# Patient Record
Sex: MALE | Race: WHITE | NOT HISPANIC OR LATINO | Employment: FULL TIME | URBAN - METROPOLITAN AREA
[De-identification: names, ages, dates, MRNs, and addresses within clinical notes are randomized per-mention and may not be internally consistent; named-entity substitution may affect disease eponyms.]

---

## 2017-01-20 ENCOUNTER — HISTORICAL (OUTPATIENT)
Dept: ADMINISTRATIVE | Facility: HOSPITAL | Age: 52
End: 2017-01-20

## 2017-01-20 LAB
ALBUMIN SERPL-MCNC: 4.8 G/DL (ref 3.1–4.7)
ALP SERPL-CCNC: 48 IU/L (ref 40–104)
ALT (SGPT): 44 IU/L (ref 3–33)
AST SERPL-CCNC: 46 IU/L (ref 10–40)
BASOPHILS NFR BLD: 0 K/UL (ref 0–0.2)
BASOPHILS NFR BLD: 0.8 %
BILIRUB SERPL-MCNC: 0.5 MG/DL (ref 0.3–1)
BUN SERPL-MCNC: 10 MG/DL (ref 8–20)
CALCIUM SERPL-MCNC: 9.1 MG/DL (ref 7.7–10.4)
CHLORIDE: 105 MMOL/L (ref 98–110)
CO2 SERPL-SCNC: 27 MMOL/L (ref 22.8–31.6)
CREATININE: 1.12 MG/DL (ref 0.6–1.4)
EOSINOPHIL NFR BLD: 0.2 K/UL (ref 0–0.7)
EOSINOPHIL NFR BLD: 4.6 %
ERYTHROCYTE [DISTWIDTH] IN BLOOD BY AUTOMATED COUNT: 14.6 % (ref 12.5–14.5)
GLUCOSE: 109 MG/DL (ref 70–99)
GRAN #: 2 K/UL (ref 1.4–6.5)
GRAN%: 50.6 %
HCT VFR BLD AUTO: 35.8 % (ref 39–55)
HGB BLD-MCNC: 11.8 G/DL (ref 14–16)
IMMATURE GRANS (ABS): 0 K/UL (ref 0–1)
IMMATURE GRANULOCYTES: 0.3 %
LYMPH #: 1.4 K/UL (ref 1.2–3.4)
LYMPH%: 35.6 %
MCH RBC QN AUTO: 31.2 PG (ref 25–35)
MCHC RBC AUTO-ENTMCNC: 33 G/DL (ref 31–36)
MCV RBC AUTO: 94.7 FL (ref 80–100)
MONO #: 0.3 K/UL (ref 0.1–0.6)
MONO%: 8.1 %
NUCLEATED RBCS: 0 %
NUCLEATED RED BLOOD CELLS: 0 /100 WBC
PERFORMED BY:: ABNORMAL
PLATELET # BLD AUTO: 273 K/UL (ref 140–440)
PMV BLD AUTO: 9.7 FL (ref 7.4–10.4)
POTASSIUM SERPL-SCNC: 3.6 MMOL/L (ref 3.5–5)
PROT SERPL-MCNC: 7.7 G/DL (ref 6–8.2)
RBC # BLD AUTO: 3.78 M/UL (ref 4.3–5.9)
SODIUM: 140 MMOL/L (ref 134–144)
WBC # BLD: 3.9 K/UL (ref 5–10)

## 2017-03-01 ENCOUNTER — HISTORICAL (OUTPATIENT)
Dept: ADMINISTRATIVE | Facility: HOSPITAL | Age: 52
End: 2017-03-01

## 2017-03-01 LAB
ALBUMIN SERPL-MCNC: 4.6 G/DL (ref 3.1–4.7)
ALP SERPL-CCNC: 49 IU/L (ref 40–104)
ALT (SGPT): 43 IU/L (ref 3–33)
AST SERPL-CCNC: 35 IU/L (ref 10–40)
BASOPHILS NFR BLD: 0 K/UL (ref 0–0.2)
BASOPHILS NFR BLD: 0.4 %
BILIRUB SERPL-MCNC: 0.6 MG/DL (ref 0.3–1)
BUN SERPL-MCNC: 13 MG/DL (ref 8–20)
CALCIUM SERPL-MCNC: 9.3 MG/DL (ref 7.7–10.4)
CHLORIDE: 100 MMOL/L (ref 98–110)
CO2 SERPL-SCNC: 29.1 MMOL/L (ref 22.8–31.6)
CREATININE: 1.18 MG/DL (ref 0.6–1.4)
EOSINOPHIL NFR BLD: 0.2 K/UL (ref 0–0.7)
EOSINOPHIL NFR BLD: 3.1 %
ERYTHROCYTE [DISTWIDTH] IN BLOOD BY AUTOMATED COUNT: 14.8 % (ref 12.5–14.5)
GLUCOSE: 96 MG/DL (ref 70–99)
GRAN #: 2.8 K/UL (ref 1.4–6.5)
GRAN%: 53.9 %
HCT VFR BLD AUTO: 35.3 % (ref 39–55)
HGB BLD-MCNC: 11.6 G/DL (ref 14–16)
IMMATURE GRANS (ABS): 0 K/UL (ref 0–1)
IMMATURE GRANULOCYTES: 0.2 %
LYMPH #: 1.6 K/UL (ref 1.2–3.4)
LYMPH%: 31.3 %
MCH RBC QN AUTO: 31.3 PG (ref 25–35)
MCHC RBC AUTO-ENTMCNC: 32.9 G/DL (ref 31–36)
MCV RBC AUTO: 95.1 FL (ref 80–100)
MONO #: 0.6 K/UL (ref 0.1–0.6)
MONO%: 11.1 %
NUCLEATED RBCS: 0 %
NUCLEATED RED BLOOD CELLS: 0 /100 WBC
PERFORMED BY:: ABNORMAL
PLATELET # BLD AUTO: 261 K/UL (ref 140–440)
PMV BLD AUTO: 9.6 FL (ref 8.8–12.7)
POTASSIUM SERPL-SCNC: 4 MMOL/L (ref 3.5–5)
PROT SERPL-MCNC: 7.8 G/DL (ref 6–8.2)
RBC # BLD AUTO: 3.71 M/UL (ref 4.3–5.9)
SODIUM: 137 MMOL/L (ref 134–144)
WBC # BLD: 5.2 K/UL (ref 5–10)

## 2017-05-02 ENCOUNTER — HISTORICAL (OUTPATIENT)
Dept: ADMINISTRATIVE | Facility: HOSPITAL | Age: 52
End: 2017-05-02

## 2017-05-02 LAB
ALBUMIN SERPL-MCNC: 4.6 G/DL (ref 3.1–4.7)
ALP SERPL-CCNC: 48 IU/L (ref 40–104)
ALT (SGPT): 42 IU/L (ref 3–33)
AST SERPL-CCNC: 33 IU/L (ref 10–40)
BASOPHILS NFR BLD: 0 K/UL (ref 0–0.2)
BASOPHILS NFR BLD: 0.4 %
BILIRUB SERPL-MCNC: 0.5 MG/DL (ref 0.3–1)
BUN SERPL-MCNC: 17 MG/DL (ref 8–20)
CALCIUM SERPL-MCNC: 9.3 MG/DL (ref 7.7–10.4)
CHLORIDE: 104 MMOL/L (ref 98–110)
CO2 SERPL-SCNC: 26.9 MMOL/L (ref 22.8–31.6)
CREATININE: 1 MG/DL (ref 0.6–1.4)
EOSINOPHIL NFR BLD: 0.1 K/UL (ref 0–0.7)
EOSINOPHIL NFR BLD: 3.1 %
ERYTHROCYTE [DISTWIDTH] IN BLOOD BY AUTOMATED COUNT: 15 % (ref 12.5–14.5)
GLUCOSE: 101 MG/DL (ref 70–99)
GRAN #: 2.5 K/UL (ref 1.4–6.5)
GRAN%: 54.9 %
HCT VFR BLD AUTO: 36.8 % (ref 39–55)
HGB BLD-MCNC: 12 G/DL (ref 14–16)
IMMATURE GRANS (ABS): 0 K/UL (ref 0–1)
IMMATURE GRANULOCYTES: 0.2 %
LYMPH #: 1.4 K/UL (ref 1.2–3.4)
LYMPH%: 30.1 %
MCH RBC QN AUTO: 30.5 PG (ref 25–35)
MCHC RBC AUTO-ENTMCNC: 32.6 G/DL (ref 31–36)
MCV RBC AUTO: 93.6 FL (ref 80–100)
MONO #: 0.5 K/UL (ref 0.1–0.6)
MONO%: 11.3 %
NUCLEATED RBCS: 0 %
NUCLEATED RED BLOOD CELLS: 0 /100 WBC
PERFORMED BY:: ABNORMAL
PLATELET # BLD AUTO: 282 K/UL (ref 140–440)
PMV BLD AUTO: 9.5 FL (ref 8.8–12.7)
POTASSIUM SERPL-SCNC: 4.3 MMOL/L (ref 3.5–5)
PROT SERPL-MCNC: 7.7 G/DL (ref 6–8.2)
RBC # BLD AUTO: 3.93 M/UL (ref 4.3–5.9)
SODIUM: 140 MMOL/L (ref 134–144)
WBC # BLD: 4.5 K/UL (ref 5–10)

## 2017-09-06 LAB
ALBUMIN SERPL-MCNC: 4.5 G/DL (ref 3.1–4.7)
ALP SERPL-CCNC: 45 IU/L (ref 40–104)
ALT (SGPT): 40 IU/L (ref 3–33)
AST SERPL-CCNC: 33 IU/L (ref 10–40)
BASOPHILS NFR BLD: 0 K/UL (ref 0–0.2)
BASOPHILS NFR BLD: 0.8 %
BILIRUB SERPL-MCNC: 0.5 MG/DL (ref 0.3–1)
BUN SERPL-MCNC: 16 MG/DL (ref 8–20)
CALCIUM SERPL-MCNC: 9.5 MG/DL (ref 7.7–10.4)
CHLORIDE: 102 MMOL/L (ref 98–110)
CO2 SERPL-SCNC: 30.6 MMOL/L (ref 22.8–31.6)
CREATININE: 1.02 MG/DL (ref 0.6–1.4)
EOSINOPHIL NFR BLD: 0.1 K/UL (ref 0–0.7)
EOSINOPHIL NFR BLD: 3.6 %
ERYTHROCYTE [DISTWIDTH] IN BLOOD BY AUTOMATED COUNT: 14.7 % (ref 12.5–14.5)
GLUCOSE: 98 MG/DL (ref 70–99)
GRAN #: 1.8 K/UL (ref 1.4–6.5)
GRAN%: 51 %
HCT VFR BLD AUTO: 35.2 % (ref 39–55)
HGB BLD-MCNC: 11.7 G/DL (ref 14–16)
IMMATURE GRANS (ABS): 0 K/UL (ref 0–1)
IMMATURE GRANULOCYTES: 0 %
ISTAT CREATININE: 1.1 MG/DL (ref 0.6–1.4)
LYMPH #: 1.2 K/UL (ref 1.2–3.4)
LYMPH%: 32.4 %
MCH RBC QN AUTO: 31.3 PG (ref 25–35)
MCHC RBC AUTO-ENTMCNC: 33.2 G/DL (ref 31–36)
MCV RBC AUTO: 94.1 FL (ref 80–100)
MONO #: 0.4 K/UL (ref 0.1–0.6)
MONO%: 12.2 %
NUCLEATED RBCS: 0 %
NUCLEATED RED BLOOD CELLS: 0 /100 WBC
PERFORMED BY:: ABNORMAL
PLATELET # BLD AUTO: 262 K/UL (ref 140–440)
PMV BLD AUTO: 9.4 FL (ref 8.8–12.7)
POTASSIUM SERPL-SCNC: 4.2 MMOL/L (ref 3.5–5)
PROT SERPL-MCNC: 7.2 G/DL (ref 6–8.2)
RBC # BLD AUTO: 3.74 M/UL (ref 4.3–5.9)
SODIUM: 141 MMOL/L (ref 134–144)
WBC # BLD: 3.6 K/UL (ref 5–10)

## 2017-09-10 PROBLEM — C49.9 SARCOMA: Status: ACTIVE | Noted: 2017-09-10

## 2017-09-10 NOTE — PROGRESS NOTES
"   [facility] Hematology/Oncology  PROGRESS NOTE      Subjective:       Patient ID:   NAME: Narciso Flanagan : 1965     52 y.o. male    Referring Doc: Lucy Bolaños (onc)  Other Physicians: Lilli Obrien (PCP)    Chief Complaint:  GIST f/u    History of Present Illness:     Patient returns today for a regularly scheduled follow-up visit.  The patient had a follow-up CT scan on . He is here with female family member. He never saw local GI or pulmonologist. He had fall with bone chip on vertebrae in neck about 3 months ago. He is seeing a chiropractor Humble Rodrigues.No CP, SOB, HA's or N/V.             ROS:   GEN: normal without any fever, night sweats or weight loss  HEENT: normal with no HA's, sore throat, stiff neck, changes in vision  CV: normal with no CP, SOB, PND, FRAIRE or orthopnea  PULM: normal with no SOB, cough, hemoptysis, sputum or pleuritic pain  GI: normal with no abdominal pain, nausea, vomiting, constipation, diarrhea, melanotic stools, BRBPR, or hematemesis  : normal with no hematuria, dysuria  BREAST: normal with no mass, discharge, pain  SKIN: normal with no rash, erythema, bruising, or swelling    Allergies:  Review of patient's allergies indicates:  No Known Allergies    Medications:    Current Outpatient Prescriptions:     glucosamine sulfate (GLUCOSAMINE) 500 mg Tab, Take by mouth., Disp: , Rfl:     IMATINIB MESYLATE (GLEEVEC ORAL), Take by mouth., Disp: , Rfl:     omeprazole (PRILOSEC) 40 MG capsule, Take 40 mg by mouth once daily., Disp: , Rfl:     PMHx/PSHx Updates:  See patient's last visit with me on 2016.  See H&P on 10/31/16        Pathology:  See path report from 2014          Objective:     Vitals:  Blood pressure 115/77, pulse 72, temperature 97.5 °F (36.4 °C), resp. rate 18, height 5' 10" (1.778 m), weight 90.2 kg (198 lb 12.8 oz).    Physical Examination:   GEN: no apparent distress, comfortable; AAOx3  HEAD: atraumatic and normocephalic  EYES: no pallor, no icterus, " PERRLA  ENT: OMM, no pharyngeal erythema, external ears WNL; no nasal discharge; no thrush  NECK: no masses, thyroid normal, trachea midline, no LAD/LN's, supple  CV: RRR with no murmur; normal pulse; normal S1 and S2; no pedal edema  CHEST: Normal respiratory effort; CTAB; normal breath sounds; no wheeze or crackles  ABDOM: nontender and nondistended; soft; normal bowel sounds; no rebound/guarding  MUSC/Skeletal: ROM normal; no crepitus; joints normal; no deformities or arthropathy  EXTREM: no clubbing, cyanosis, inflammation or swelling  SKIN: no rashes, lesions, ulcers, petechiae or subcutaneous nodules  : no pascual  NEURO: grossly intact; motor/sensory WNL; AAOx3; no tremors  PSYCH: normal mood, affect and behavior  LYMPH: normal cervical, supraclavicular, axillary and groin LN's            Labs:    9/6/17  Lab Results   Component Value Date    WBC 3.6 (L) 09/06/2017    HGB 11.7 (L) 09/06/2017    HCT 35.2 (L) 09/06/2017    MCV 77.6 (L) 01/11/2011     09/06/2017     BMP  Lab Results   Component Value Date     09/06/2017    K 4.2 09/06/2017     09/06/2017    CO2 30.6 09/06/2017    BUN 16 09/06/2017    CREATININE 1.02 09/06/2017    CALCIUM 9.5 09/06/2017    ANIONGAP 15 01/11/2011    ESTGFRAFRICA >60 01/11/2011    EGFRNONAA >60 01/11/2011             Radiology/Diagnostic Studies:    Ct Chest With Contrast    Result Date: 9/6/2017  CMS MANDATED QUALITY DATA - CT RADIATION - 436 All CT scans at this facility utilize dose modulation, iterative reconstruction, and/or weight based dosing when appropriate to reduce radiation dose to as low as reasonably achievable. 100 mL of Omnipaque 350 was administered. MDI CHEST  W CONTRAST CT Indication: C49.9. 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 Respiratory motion artifact mildly diminishes exam sensitivity. Within these limitations, no new pulmonary nodule or mass is evident. There is a stable left lower lobe pneumatocele. There is subsegmental atelectasis involving the dependent components of both lungs as well as scattered mild groundglass opacity suggestive of air trapping. No pleural effusion. Central airways are patent. There is a small hiatal hernia. The esophagus is otherwise unremarkable. There are normal size mediastinal lymph nodes. Stable soft tissue densities in the anterior superior mediastinum may reflect residual thymic tissue and are unchanged from prior. Aorta is normal in caliber. There is bilateral gynecomastia. Bone window images demonstrate no acute or aggressive osseous lesion. Limited images of the upper abdomen demonstrate scattered subcentimeter hypodense hepatic lesions as well as small hyperenhancing arterial foci. Please see separately MRI abdomen performed same day for further description of hepatic abnormalities.     Impression: 1. Stable CT of the chest demonstrating no convincing evidence of intrathoracic metastasis. 2. Stable right lower lobe pulmonary sequestration and other incidental findings as above. Read and electronically signed by: Pa Travis MD on 9/6/2017 9:34 AM CDT PA TRAVIS MD    Mra Abdomen    Result Date: 9/6/2017  CLINICAL HISTORY: 52 years (1965) Male C49.9 patient has a history of a GIST tumor diagnosed in 2014, here for follow-up of liver tumors. TECHNIQUE: MDI ABDOMEN W/WO CONTRAST MRI. 652 images obtained. MRI of the abdomen was performed using a 1.5 Naheed magnet. CONTRAST: 20 mL of Omniscan was administered uneventfully by IV. COMPARISON: CT of the abdomen from 03/01/2017, 12/12/2016 and MRI from 01/28/2015 FINDINGS: Inferior thorax: Again noted is the rounded 6.1 x 3.7 cm mass in the posterior right  lower lobe which is some ill-defined contrast enhancement partially characterized on this exam. This appears to show a arterial supply arising directly from the aorta on the delayed phase contrast enhanced exam, unchanged from the previous exam and consistent with pulmonary sequestration. Liver: Normal in size and configuration. Again noted are scattered rounded T2 hyperintense foci throughout the liver, most consistent with cysts the largest of which are outlined below: - 4 mm lesion in the right lobe of the liver (image 42 series 3) and shows no abnormal enhancement. - 5 mm lesion in the central right lobe of liver is unchanged (image 43) - 2 mm diameter T2 hyperintense foci in the subcapsular right lobe of liver is similarly unchanged. - 4 mm diameter lesion (image 56 series 3) is unchanged These findings are unchanged from the previous exam without new or concerning lesion in the liver identified. There is mild relative diffuse loss of signal intensity within the liver consistent with hepatic steatosis. There is a replaced hepatic arterial supply arising from the aorta. Bile Ducts: No intrahepatic or extrahepatic bile duct dilation is noted. Gallbladder: Normal. Pancreas and Pancreatic Duct: Normal. No pancreatic duct dilation. Spleen: Normal. Adrenal Glands: Normal. Kidneys: Hydronephrosis or hydroureter. There are small cyst seen in the kidneys bilaterally. Peritoneum/Mesentery/Omentum: There is no ascites. Lymph Nodes: No abdominal lymphadenopathy is seen. Visualized Bowel: The colonic diverticula without evidence of acute diverticulitis. Vessels: Normal in caliber. Grossly patent. Bones: No suspicious osseous lesions are seen. Soft Tissues: Unremarkable. Other Findings: None.     IMPRESSION: Enhanced MR - of the abdomen 1. No significant adverse interval change compared to the previous exam, with no new or concerning lesion identified noting sub-5 mm rounded low-attenuation T2 hyperintense foci in the liver  without diffusion restriction or contrast enhancement, favored to represent posttreatment response in a patient with a history of metastatic liver disease. 2. Loss of signal intensity in the liver on opposed phase imaging consistent with hepatic steatosis. 3. Unchanged mass in the posterior right lower lobe partially imaged on this exam with systemic arterial supply in keeping with a pulmonary sequestration. Read and electronically signed by: Brent Burris MD on 9/6/2017 9:52 AM CDT BRENT BURRIS MD      I have reviewed all available lab results and radiology reports.    Assessment/Plan:   (1) 52 y.o. 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  - originally diagnosed in Sept 2014  - he was previously under the care of Dr Rosalind Bolaños at [facility] in [place]  - he has been on Gleevec since Oct 2014  - last CT scans and MR of abdom on 9/6 appear to be stable      (2) Anemia - chronic with microcytic indices; most likely multifactorial etiology; will check iron panel and consider oral iron    (3) Mild leucopenia - also chronic     (4) RLL calcified lung nodule - chronic - I referred him to see Dr Downs with Pulm in the past but he did not follow-up  - stable on latest CT    (5) GERD - followed by Dr Leo Sargent with [facility] GI in past and he was supposed to see local GI specialist but did not    (6) mild vertebral bone chip after fall 3 months ago - seen and followed by Humble Rodrigues ([facility])    (7) Noncompliance with requested referrals - Noncomplinance issue  - patient is persistently noncompliant with my recommendations, medical advice and/or requests which hinders my ability to provide the patient with adequate care. Their continued noncompliant behavior places their own health at risk and could potentially jeopardize their life.       PLAN:  1. Continue with the Gleevec  2. Repeat CT's in 6 months  3. Encouraged compliance  4. Check labs every 3 months and check  up to date iron panel  RTC in 6 months  Fax note to Miky Ureña    Discussion:     I have explained all of the above in detail and the patient understands all of the current recommendation(s). I have answered all of their questions to the best of my ability and to their complete satisfaction.   The patient is to continue with the current management plan.            Electronically signed by Fran Daley MD

## 2017-09-11 ENCOUNTER — OFFICE VISIT (OUTPATIENT)
Dept: HEMATOLOGY/ONCOLOGY | Facility: CLINIC | Age: 52
End: 2017-09-11
Payer: COMMERCIAL

## 2017-09-11 VITALS
WEIGHT: 198.81 LBS | SYSTOLIC BLOOD PRESSURE: 115 MMHG | DIASTOLIC BLOOD PRESSURE: 77 MMHG | HEART RATE: 72 BPM | TEMPERATURE: 98 F | BODY MASS INDEX: 28.46 KG/M2 | RESPIRATION RATE: 18 BRPM | HEIGHT: 70 IN

## 2017-09-11 DIAGNOSIS — C49.A3 MALIGNANT GASTROINTESTINAL STROMAL TUMOR (GIST) OF SMALL INTESTINE: ICD-10-CM

## 2017-09-11 DIAGNOSIS — D64.9 ANEMIA, UNSPECIFIED: ICD-10-CM

## 2017-09-11 DIAGNOSIS — R91.1 LUNG NODULE: ICD-10-CM

## 2017-09-11 DIAGNOSIS — D63.8 ANEMIA OF OTHER CHRONIC DISEASE: ICD-10-CM

## 2017-09-11 DIAGNOSIS — C49.9 SARCOMA: ICD-10-CM

## 2017-09-11 PROBLEM — C49.A0 GIST, MALIGNANT: Status: ACTIVE | Noted: 2017-09-11

## 2017-09-11 LAB
% SATURATION: 17 %
ALBUMIN SERPL-MCNC: 4.4 G/DL (ref 3.1–4.7)
ALP SERPL-CCNC: 45 IU/L (ref 40–104)
ALT (SGPT): 41 IU/L (ref 3–33)
AST SERPL-CCNC: 40 IU/L (ref 10–40)
BASOPHILS NFR BLD: 0 K/UL (ref 0–0.2)
BASOPHILS NFR BLD: 0.5 %
BILIRUB SERPL-MCNC: 0.5 MG/DL (ref 0.3–1)
BUN SERPL-MCNC: 12 MG/DL (ref 8–20)
CALCIUM SERPL-MCNC: 9.1 MG/DL (ref 7.7–10.4)
CHLORIDE: 101 MMOL/L (ref 98–110)
CO2 SERPL-SCNC: 28.5 MMOL/L (ref 22.8–31.6)
CREATININE: 1 MG/DL (ref 0.6–1.4)
EOSINOPHIL NFR BLD: 0.2 K/UL (ref 0–0.7)
EOSINOPHIL NFR BLD: 4.2 %
ERYTHROCYTE [DISTWIDTH] IN BLOOD BY AUTOMATED COUNT: 14.9 % (ref 12.5–14.5)
FERRITIN SERPL-MCNC: 100 NG/ML (ref 37–201)
GLUCOSE: 101 MG/DL (ref 70–99)
GRAN #: 2.2 K/UL (ref 1.4–6.5)
GRAN%: 52.7 %
HCT VFR BLD AUTO: 34.9 % (ref 39–55)
HGB BLD-MCNC: 11.6 G/DL (ref 14–16)
IMMATURE GRANS (ABS): 0 K/UL (ref 0–1)
IMMATURE GRANULOCYTES: 0.2 %
IRON: 66 MCG/DL (ref 32–176)
LYMPH #: 1.3 K/UL (ref 1.2–3.4)
LYMPH%: 30 %
MCH RBC QN AUTO: 31.4 PG (ref 25–35)
MCHC RBC AUTO-ENTMCNC: 33.2 G/DL (ref 31–36)
MCV RBC AUTO: 94.6 FL (ref 80–100)
MONO #: 0.5 K/UL (ref 0.1–0.6)
MONO%: 12.4 %
NUCLEATED RBCS: 0 %
NUCLEATED RED BLOOD CELLS: 0 /100 WBC
PERFORMED BY:: ABNORMAL
PLATELET # BLD AUTO: 256 K/UL (ref 140–440)
PMV BLD AUTO: 9.3 FL (ref 8.8–12.7)
POTASSIUM SERPL-SCNC: 4.1 MMOL/L (ref 3.5–5)
PROT SERPL-MCNC: 7.2 G/DL (ref 6–8.2)
RBC # BLD AUTO: 3.69 M/UL (ref 4.3–5.9)
SODIUM: 138 MMOL/L (ref 134–144)
TOTAL IRON BINDING CAPACITY: 396 MCG/DL (ref 177–435)
WBC # BLD: 4.3 K/UL (ref 5–10)

## 2017-09-11 PROCEDURE — 99214 OFFICE O/P EST MOD 30 MIN: CPT | Mod: ,,, | Performed by: INTERNAL MEDICINE

## 2017-09-11 PROCEDURE — 3008F BODY MASS INDEX DOCD: CPT | Mod: ,,, | Performed by: INTERNAL MEDICINE

## 2017-09-11 RX ORDER — OMEPRAZOLE 40 MG/1
40 CAPSULE, DELAYED RELEASE ORAL DAILY
COMMUNITY

## 2017-09-11 RX ORDER — METHYLPREDNISOLONE 4 MG
TABLET, DOSE PACK ORAL
COMMUNITY

## 2017-09-15 ENCOUNTER — TELEPHONE (OUTPATIENT)
Dept: HEMATOLOGY/ONCOLOGY | Facility: CLINIC | Age: 52
End: 2017-09-15

## 2017-09-15 NOTE — TELEPHONE ENCOUNTER
Spoke to patient letting him know that  did see his recent labs and that the values are ok with him. I told patient not to worry about iron values for now. I told him unless he has any problems , he is to recheck labs in 3 months then again 1 week before his next followup which is in March. 2018. patimell voiced understanding

## 2017-12-04 LAB
% SATURATION: 20 %
ALBUMIN SERPL-MCNC: 4.4 G/DL (ref 3.1–4.7)
ALP SERPL-CCNC: 46 IU/L (ref 40–104)
ALT (SGPT): 57 IU/L (ref 3–33)
AST SERPL-CCNC: 57 IU/L (ref 10–40)
BASOPHILS NFR BLD: 0 K/UL (ref 0–0.2)
BASOPHILS NFR BLD: 0.5 %
BILIRUB SERPL-MCNC: 0.9 MG/DL (ref 0.3–1)
BUN SERPL-MCNC: 12 MG/DL (ref 8–20)
CALCIUM SERPL-MCNC: 9.1 MG/DL (ref 7.7–10.4)
CHLORIDE: 102 MMOL/L (ref 98–110)
CO2 SERPL-SCNC: 26.1 MMOL/L (ref 22.8–31.6)
CREATININE: 1 MG/DL (ref 0.6–1.4)
EOSINOPHIL NFR BLD: 0.1 K/UL (ref 0–0.7)
EOSINOPHIL NFR BLD: 2.2 %
ERYTHROCYTE [DISTWIDTH] IN BLOOD BY AUTOMATED COUNT: 14.6 % (ref 12.5–14.5)
FERRITIN SERPL-MCNC: 135 NG/ML (ref 37–201)
GLUCOSE: 155 MG/DL (ref 70–99)
GRAN #: 4.7 K/UL (ref 1.4–6.5)
GRAN%: 73.7 %
HCT VFR BLD AUTO: 34.7 % (ref 39–55)
HGB BLD-MCNC: 11.7 G/DL (ref 14–16)
IMMATURE GRANS (ABS): 0 K/UL (ref 0–1)
IMMATURE GRANULOCYTES: 0.5 %
IRON: 80 MCG/DL (ref 32–176)
LYMPH #: 1.1 K/UL (ref 1.2–3.4)
LYMPH%: 16.5 %
MCH RBC QN AUTO: 31.3 PG (ref 25–35)
MCHC RBC AUTO-ENTMCNC: 33.7 G/DL (ref 31–36)
MCV RBC AUTO: 92.8 FL (ref 80–100)
MONO #: 0.4 K/UL (ref 0.1–0.6)
MONO%: 6.6 %
NUCLEATED RBCS: 0 %
NUCLEATED RED BLOOD CELLS: 0 /100 WBC
PERFORMED BY:: ABNORMAL
PLATELET # BLD AUTO: 260 K/UL (ref 140–440)
PMV BLD AUTO: 9.4 FL (ref 8.8–12.7)
POTASSIUM SERPL-SCNC: 3.6 MMOL/L (ref 3.5–5)
PROT SERPL-MCNC: 7.5 G/DL (ref 6–8.2)
RBC # BLD AUTO: 3.74 M/UL (ref 4.3–5.9)
SODIUM: 137 MMOL/L (ref 134–144)
TOTAL IRON BINDING CAPACITY: 408 MCG/DL (ref 177–435)
WBC # BLD: 6.4 K/UL (ref 5–10)

## 2017-12-06 ENCOUNTER — TELEPHONE (OUTPATIENT)
Dept: HEMATOLOGY/ONCOLOGY | Facility: CLINIC | Age: 52
End: 2017-12-06

## 2017-12-06 NOTE — TELEPHONE ENCOUNTER
----- Message from Laura Motley sent at 12/6/2017 11:26 AM CST -----  Patient called in stating that he is out on the boat and is vomiting and having diarrhea. He would like to know what he can take over the counter or if he needs to get off the boat and go to the Dr.? Please advise and call patient at [phone]. Thanks

## 2017-12-06 NOTE — TELEPHONE ENCOUNTER
Spoke with pt. States he was having chills, nausea and diarrhea. Currently on Gleevec. Instructed sometimes associated with nausea but should not have fever. Instructed that he needed to go get swabbed for Flu to assure it wasn't that and if so they could give appropriate meds for this. VU.

## 2018-02-19 DIAGNOSIS — C49.A3 MALIGNANT GASTROINTESTINAL STROMAL TUMOR (GIST) OF SMALL INTESTINE: Primary | ICD-10-CM

## 2018-03-14 ENCOUNTER — HISTORICAL (OUTPATIENT)
Dept: ADMINISTRATIVE | Facility: HOSPITAL | Age: 53
End: 2018-03-14

## 2018-03-14 LAB
BASOPHILS NFR BLD: 0 K/UL (ref 0–0.2)
BASOPHILS NFR BLD: 1 %
EOSINOPHIL NFR BLD: 0.1 K/UL (ref 0–0.7)
EOSINOPHIL NFR BLD: 3.4 %
ERYTHROCYTE [DISTWIDTH] IN BLOOD BY AUTOMATED COUNT: 14.8 % (ref 12.5–14.5)
GRAN #: 1.9 K/UL (ref 1.4–6.5)
GRAN%: 49.9 %
HCT VFR BLD AUTO: 35.6 % (ref 39–55)
HGB BLD-MCNC: 11.7 G/DL (ref 14–16)
IMMATURE GRANS (ABS): 0 K/UL (ref 0–1)
IMMATURE GRANULOCYTES: 0.3 %
LYMPH #: 1.3 K/UL (ref 1.2–3.4)
LYMPH%: 34.5 %
MCH RBC QN AUTO: 31.4 PG (ref 25–35)
MCHC RBC AUTO-ENTMCNC: 32.9 G/DL (ref 31–36)
MCV RBC AUTO: 95.4 FL (ref 80–100)
MONO #: 0.4 K/UL (ref 0.1–0.6)
MONO%: 10.9 %
NUCLEATED RBCS: 0 %
NUCLEATED RED BLOOD CELLS: 0 /100 WBC
PERFORMED BY:: ABNORMAL
PLATELET # BLD AUTO: 253 K/UL (ref 140–440)
PMV BLD AUTO: 9.6 FL (ref 8.8–12.7)
RBC # BLD AUTO: 3.73 M/UL (ref 4.3–5.9)
WBC # BLD: 3.9 K/UL (ref 5–10)

## 2018-03-16 ENCOUNTER — TELEPHONE (OUTPATIENT)
Dept: HEMATOLOGY/ONCOLOGY | Facility: CLINIC | Age: 53
End: 2018-03-16

## 2018-03-16 NOTE — TELEPHONE ENCOUNTER
----- Message from Fran Daley MD sent at 3/15/2018  7:59 PM CDT -----  Call him with stable report

## 2018-03-16 NOTE — TELEPHONE ENCOUNTER
Patient called with stable report and voiced understanding.      ----- Message from Fran Daley MD sent at 3/15/2018  7:59 PM CDT -----  Call him with stable report

## 2018-03-19 ENCOUNTER — OFFICE VISIT (OUTPATIENT)
Dept: HEMATOLOGY/ONCOLOGY | Facility: CLINIC | Age: 53
End: 2018-03-19
Payer: COMMERCIAL

## 2018-03-19 VITALS
DIASTOLIC BLOOD PRESSURE: 67 MMHG | TEMPERATURE: 98 F | HEIGHT: 71 IN | SYSTOLIC BLOOD PRESSURE: 107 MMHG | WEIGHT: 187.81 LBS | RESPIRATION RATE: 18 BRPM | BODY MASS INDEX: 26.29 KG/M2 | HEART RATE: 75 BPM

## 2018-03-19 DIAGNOSIS — D63.8 ANEMIA, CHRONIC DISEASE: ICD-10-CM

## 2018-03-19 DIAGNOSIS — D64.9 ANEMIA, UNSPECIFIED TYPE: ICD-10-CM

## 2018-03-19 DIAGNOSIS — C49.A3 MALIGNANT GASTROINTESTINAL STROMAL TUMOR (GIST) OF SMALL INTESTINE: Primary | ICD-10-CM

## 2018-03-19 PROCEDURE — 99214 OFFICE O/P EST MOD 30 MIN: CPT | Mod: ,,, | Performed by: INTERNAL MEDICINE

## 2018-03-19 NOTE — PROGRESS NOTES
[facility] Hematology/Oncology  PROGRESS NOTE      Subjective:       Patient ID:   NAME: Narciso Flanagan : 1965     53 y.o. male    Referring Doc: Lucy Bolaños (onc)  Other Physicians: Lilli Obiren (PCP)    Chief Complaint:  GIST f/u    History of Present Illness:     Patient returns today for a regularly scheduled follow-up visit.  The patient had a follow-up CT scan on . He is here with wife. He had some recent scans. He reports that he is doing ok. No CP, SOB, HA's or N/V.             ROS:   GEN: normal without any fever, night sweats or weight loss  HEENT: normal with no HA's, sore throat, stiff neck, changes in vision  CV: normal with no CP, SOB, PND, FRAIRE or orthopnea  PULM: normal with no SOB, cough, hemoptysis, sputum or pleuritic pain  GI: normal with no abdominal pain, nausea, vomiting, constipation, diarrhea, melanotic stools, BRBPR, or hematemesis  : normal with no hematuria, dysuria  BREAST: normal with no mass, discharge, pain  SKIN: normal with no rash, erythema, bruising, or swelling    Allergies:  Review of patient's allergies indicates:  No Known Allergies    Medications:    Current Outpatient Prescriptions:     glucosamine sulfate (GLUCOSAMINE) 500 mg Tab, Take by mouth., Disp: , Rfl:     IMATINIB MESYLATE (GLEEVEC ORAL), Take by mouth., Disp: , Rfl:     omeprazole (PRILOSEC) 40 MG capsule, Take 40 mg by mouth once daily., Disp: , Rfl:     PMHx/PSHx Updates:  See patient's last visit with me on 2016.  See H&P on 10/31/16        Pathology:  See path report from 2014          Objective:     Vitals:  There were no vitals taken for this visit.    Physical Examination:   GEN: no apparent distress, comfortable; AAOx3  HEAD: atraumatic and normocephalic  EYES: no pallor, no icterus, PERRLA  ENT: OMM, no pharyngeal erythema, external ears WNL; no nasal discharge; no thrush  NECK: no masses, thyroid normal, trachea midline, no LAD/LN's, supple  CV: RRR with no murmur; normal pulse;  normal S1 and S2; no pedal edema  CHEST: Normal respiratory effort; CTAB; normal breath sounds; no wheeze or crackles  ABDOM: nontender and nondistended; soft; normal bowel sounds; no rebound/guarding  MUSC/Skeletal: ROM normal; no crepitus; joints normal; no deformities or arthropathy  EXTREM: no clubbing, cyanosis, inflammation or swelling  SKIN: no rashes, lesions, ulcers, petechiae or subcutaneous nodules  : no pascual  NEURO: grossly intact; motor/sensory WNL; AAOx3; no tremors  PSYCH: normal mood, affect and behavior  LYMPH: normal cervical, supraclavicular, axillary and groin LN's            Labs:       3/14/2018    Lab Results   Component Value Date    WBC 3.9 (L) 03/14/2018    HGB 11.7 (L) 03/14/2018    HCT 35.6 (L) 03/14/2018    MCV 77.6 (L) 01/11/2011     03/14/2018     BMP  Lab Results   Component Value Date     03/14/2018    K 4.0 03/14/2018     03/14/2018    CO2 27.8 03/14/2018    BUN 16 03/14/2018    CREATININE 1.06 03/14/2018    CALCIUM 9.1 03/14/2018    ANIONGAP 15 01/11/2011    ESTGFRAFRICA >60 01/11/2011    EGFRNONAA >60 01/11/2011             Radiology/Diagnostic Studies:    CT scans on 3/14/2018:  IMPRESSION:    1. Stable appearance of numerous subcentimeter hypodense hepatic lesions  compared to prior exams. No new or enlarging hepatic lesion.  2. No CT evidence of intrathoracic metastasis.  3. 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  4. Stable incidental findings as above.      MRA Abdom 3/15/2018:    IMPRESSION:  1. Tiny circumscribed T1 hypointense and T2 hyperintense hepatic lesions are  unchanged when compared to September 2017, with no new hepatic abnormalities  identified.  2. Lobulated soft tissue mass in the posterior segment of the right lower lobe  is unchanged. By report, this reflects pulmonary sequestration.  3. Small bilateral renal cysts.  4. Small amount of gallbladder sludge.  I have reviewed all available lab results and radiology  reports.    Assessment/Plan:   (1) 53 y.o. 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  - originally diagnosed in Sept 2014  - he was previously under the care of Dr Rosalind Bolaños at [facility] in [place]  - he has been on Gleevec since Oct 2014  - last CT scans and MR of abdom on 3/14/2018 and 3/15/2018 are stable    (2) Anemia - chronic with microcytic indices; most likely multifactorial etiology; will check iron panel and consider oral iron  - hgb at 11.7 and stable  - iron was good at 80 and ferritin was good at 135    (3) Mild leucopenia - also chronic; latest wbc at 3.9    (4) RLL calcified lung nodule - chronic - I referred him to see Dr Downs with Pulm in the past but he did not follow-up  - stable on latest CT    (5) GERD - followed by Dr Leo Sargent with [facility] in past and he was supposed to see local GI specialist but did not    (6) mild vertebral bone chip after fall 3 months ago - seen and followed by Humble Rodrigues ([facility])    (7) Noncompliance with requested referrals - Noncomplinance issue  - patient is persistently noncompliant with my recommendations, medical advice and/or requests which hinders my ability to provide the patient with adequate care. Their continued noncompliant behavior places their own health at risk and could potentially jeopardize their life.       PLAN:  1. Continue with the Gleevec  2. Repeat CT's in 6 months  3. Encouraged compliance  4. Check labs every 3 months   RTC in 6 months  Fax note to Miky Ureña    Discussion:     I have explained all of the above in detail and the patient understands all of the current recommendation(s). I have answered all of their questions to the best of my ability and to their complete satisfaction.   The patient is to continue with the current management plan.            Electronically signed by Fran Daley MD

## 2018-05-21 DIAGNOSIS — C49.A3 MALIGNANT GASTROINTESTINAL STROMAL TUMOR (GIST) OF SMALL INTESTINE: Primary | ICD-10-CM

## 2018-05-21 NOTE — TELEPHONE ENCOUNTER
----- Message from Laura Motley sent at 5/21/2018  2:37 PM CDT -----  Patient called in requesting RX refill on Gleevac please be called in or sent to [facility] in [place] on [id number]. Please contact patient once completed at [phone]. Thanks

## 2018-05-22 RX ORDER — IMATINIB MESYLATE 400 MG/1
400 TABLET, FILM COATED ORAL DAILY
Qty: 30 TABLET | Refills: 6 | Status: SHIPPED | OUTPATIENT
Start: 2018-05-22 | End: 2019-01-22 | Stop reason: SDUPTHER

## 2018-06-04 LAB
% SATURATION: 18 %
ALBUMIN SERPL-MCNC: 4.9 G/DL (ref 3.1–4.7)
ALP SERPL-CCNC: 43 IU/L (ref 40–104)
ALT (SGPT): 30 IU/L (ref 3–33)
AST SERPL-CCNC: 31 IU/L (ref 10–40)
BASOPHILS NFR BLD: 0 K/UL (ref 0–0.2)
BASOPHILS NFR BLD: 0.6 %
BILIRUB SERPL-MCNC: 0.6 MG/DL (ref 0.3–1)
BUN SERPL-MCNC: 13 MG/DL (ref 8–20)
CALCIUM SERPL-MCNC: 9.6 MG/DL (ref 7.7–10.4)
CHLORIDE: 103 MMOL/L (ref 98–110)
CO2 SERPL-SCNC: 28.5 MMOL/L (ref 22.8–31.6)
CREATININE: 1.03 MG/DL (ref 0.6–1.4)
EOSINOPHIL NFR BLD: 0.1 K/UL (ref 0–0.7)
EOSINOPHIL NFR BLD: 2 %
ERYTHROCYTE [DISTWIDTH] IN BLOOD BY AUTOMATED COUNT: 14.4 % (ref 12.5–14.5)
FERRITIN SERPL-MCNC: 111 NG/ML (ref 37–201)
GLUCOSE: 101 MG/DL (ref 70–99)
GRAN #: 2.9 K/UL (ref 1.4–6.5)
GRAN%: 59.1 %
HCT VFR BLD AUTO: 37.9 % (ref 39–55)
HGB BLD-MCNC: 12.6 G/DL (ref 14–16)
IMMATURE GRANS (ABS): 0 K/UL (ref 0–1)
IMMATURE GRANULOCYTES: 0.2 %
IRON: 74 MCG/DL (ref 32–176)
LYMPH #: 1.4 K/UL (ref 1.2–3.4)
LYMPH%: 28.6 %
MCH RBC QN AUTO: 31.3 PG (ref 25–35)
MCHC RBC AUTO-ENTMCNC: 33.2 G/DL (ref 31–36)
MCV RBC AUTO: 94.3 FL (ref 80–100)
MONO #: 0.5 K/UL (ref 0.1–0.6)
MONO%: 9.5 %
NUCLEATED RBCS: 0 %
NUCLEATED RED BLOOD CELLS: 0 /100 WBC
PERFORMED BY:: ABNORMAL
PLATELET # BLD AUTO: 252 K/UL (ref 140–440)
PMV BLD AUTO: 9.8 FL (ref 8.8–12.7)
POTASSIUM SERPL-SCNC: 4.1 MMOL/L (ref 3.5–5)
PROT SERPL-MCNC: 7.5 G/DL (ref 6–8.2)
RBC # BLD AUTO: 4.02 M/UL (ref 4.3–5.9)
SODIUM: 140 MMOL/L (ref 134–144)
TOTAL IRON BINDING CAPACITY: 419 MCG/DL (ref 177–435)
WBC # BLD: 5 K/UL (ref 5–10)

## 2018-09-19 ENCOUNTER — TELEPHONE (OUTPATIENT)
Dept: HEMATOLOGY/ONCOLOGY | Facility: CLINIC | Age: 53
End: 2018-09-19

## 2018-09-19 DIAGNOSIS — C49.A3 MALIGNANT GASTROINTESTINAL STROMAL TUMOR (GIST) OF SMALL INTESTINE: Primary | ICD-10-CM

## 2018-09-24 ENCOUNTER — OFFICE VISIT (OUTPATIENT)
Dept: HEMATOLOGY/ONCOLOGY | Facility: CLINIC | Age: 53
End: 2018-09-24
Payer: COMMERCIAL

## 2018-09-24 VITALS
WEIGHT: 189 LBS | DIASTOLIC BLOOD PRESSURE: 77 MMHG | TEMPERATURE: 98 F | BODY MASS INDEX: 26.74 KG/M2 | SYSTOLIC BLOOD PRESSURE: 121 MMHG | RESPIRATION RATE: 18 BRPM | HEART RATE: 62 BPM

## 2018-09-24 DIAGNOSIS — D63.8 ANEMIA, CHRONIC DISEASE: ICD-10-CM

## 2018-09-24 DIAGNOSIS — D64.9 ANEMIA, UNSPECIFIED TYPE: ICD-10-CM

## 2018-09-24 DIAGNOSIS — C49.A3 MALIGNANT GASTROINTESTINAL STROMAL TUMOR (GIST) OF SMALL INTESTINE: Primary | ICD-10-CM

## 2018-09-24 PROCEDURE — 99214 OFFICE O/P EST MOD 30 MIN: CPT | Mod: ,,, | Performed by: INTERNAL MEDICINE

## 2018-09-24 PROCEDURE — 3008F BODY MASS INDEX DOCD: CPT | Mod: ,,, | Performed by: INTERNAL MEDICINE

## 2018-09-24 NOTE — PROGRESS NOTES
[facility] Hematology/Oncology  PROGRESS NOTE      Subjective:       Patient ID:   NAME: Narciso Flanagan : 1965     53 y.o. male    Referring Doc: Lucy Bolaños (onc)  Other Physicians: Lilli Obrien (PCP)    Chief Complaint:  GIST f/u    History of Present Illness:     Patient returns today for a regularly scheduled follow-up visit.  The patient had a follow-up scan. He is here with wife and sister. He had some recent scan with MRA. He reports that he is doing ok. No CP, SOB, HA's or N/V. He has had some fatigue and generalized aches in hands and arms.             ROS:   GEN: normal without any fever, night sweats or weight loss  HEENT: normal with no HA's, sore throat, stiff neck, changes in vision  CV: normal with no CP, SOB, PND, FRAIRE or orthopnea  PULM: normal with no SOB, cough, hemoptysis, sputum or pleuritic pain  GI: normal with no abdominal pain, nausea, vomiting, constipation, diarrhea, melanotic stools, BRBPR, or hematemesis  : normal with no hematuria, dysuria  BREAST: normal with no mass, discharge, pain  SKIN: normal with no rash, erythema, bruising, or swelling    Allergies:  Review of patient's allergies indicates:  No Known Allergies    Medications:    Current Outpatient Medications:     glucosamine sulfate (GLUCOSAMINE) 500 mg Tab, Take by mouth., Disp: , Rfl:     imatinib (GLEEVEC) 400 MG Tab, Take 1 tablet (400 mg total) by mouth once daily., Disp: 30 tablet, Rfl: 6    omeprazole (PRILOSEC) 40 MG capsule, Take 40 mg by mouth once daily., Disp: , Rfl:     PMHx/PSHx Updates:  See patient's last visit with me on 3/19/2018  See H&P on 10/31/16        Pathology:  See path report from 2014          Objective:     Vitals:  Blood pressure 121/77, pulse 62, temperature 97.8 °F (36.6 °C), resp. rate 18, weight 85.7 kg (189 lb).    Physical Examination:   GEN: no apparent distress, comfortable; AAOx3  HEAD: atraumatic and normocephalic  EYES: no pallor, no icterus, PERRLA  ENT: OMM, no  pharyngeal erythema, external ears WNL; no nasal discharge; no thrush  NECK: no masses, thyroid normal, trachea midline, no LAD/LN's, supple  CV: RRR with no murmur; normal pulse; normal S1 and S2; no pedal edema  CHEST: Normal respiratory effort; CTAB; normal breath sounds; no wheeze or crackles  ABDOM: nontender and nondistended; soft; normal bowel sounds; no rebound/guarding  MUSC/Skeletal: ROM normal; no crepitus; joints normal; no deformities or arthropathy  EXTREM: no clubbing, cyanosis, inflammation or swelling  SKIN: no rashes, lesions, ulcers, petechiae or subcutaneous nodules  : no pascual  NEURO: grossly intact; motor/sensory WNL; AAOx3; no tremors  PSYCH: normal mood, affect and behavior  LYMPH: normal cervical, supraclavicular, axillary and groin LN's            Labs:       9/19/2018    Lab Results   Component Value Date    WBC 3.8 (L) 09/19/2018    HGB 12.2 (L) 09/19/2018    HCT 37.4 (L) 09/19/2018    MCV 77.6 (L) 01/11/2011     09/19/2018     BMP  Lab Results   Component Value Date     09/19/2018    K 4.2 09/19/2018     09/19/2018    CO2 29.4 09/19/2018    BUN 16 09/19/2018    CREATININE 0.99 09/19/2018    CALCIUM 9.0 09/19/2018    ANIONGAP 15 01/11/2011    ESTGFRAFRICA >60 01/11/2011    EGFRNONAA >60 01/11/2011             Radiology/Diagnostic Studies:    MRA Abdom 9/19/2018    IMPRESSION:    Vessel arising from the suprarenal segment of the abdominal aorta above the origin of the celiac branches apparently related to previously described pulmonary sequestration.    The celiac artery branches arise independently from the aorta as anatomic variant.    No additional significant abnormality identified.      CT scans on 3/14/2018:  IMPRESSION:    1. Stable appearance of numerous subcentimeter hypodense hepatic lesions  compared to prior exams. No new or enlarging hepatic lesion.  2. No CT evidence of intrathoracic metastasis.  3. 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  4. Stable incidental findings as above.      MRA Abdom 3/15/2018:    IMPRESSION:  1. Tiny circumscribed T1 hypointense and T2 hyperintense hepatic lesions are  unchanged when compared to September 2017, with no new hepatic abnormalities  identified.  2. Lobulated soft tissue mass in the posterior segment of the right lower lobe  is unchanged. By report, this reflects pulmonary sequestration.  3. Small bilateral renal cysts.  4. Small amount of gallbladder sludge.  I have reviewed all available lab results and radiology reports.    Assessment/Plan:   (1) 53 y.o. 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 in [place]  - he has been on Gleevec since Oct 2014  - last CT scans and MRA of abdom on 3/14/2018 and 3/15/2018 are stable  - he had MRA on 9/19/2018 which overall looks stable      (2) Anemia - chronic with microcytic indices; most likely multifactorial etiology; will check iron panel and consider oral iron  - hgb at 12.2 and stable  - iron was good at 74 and ferritin was good at 111    (3) Mild leucopenia - also chronic; latest wbc at 3.8 and adequate    (4) RLL calcified lung nodule - chronic - I referred him to see Dr Downs with Pulm in the past but he did not follow-up  - stable on latest CT    (5) GERD - followed by Dr Leo Sargent with [facility] in past and he was supposed to see local GI specialist but did not    (6) mild vertebral bone chip after fall 3 months ago - seen and followed by Humble Rodrigues ([facility])    (7) Noncompliance with requested referrals - Noncomplinance issue  - patient is persistently noncompliant with my recommendations, medical advice and/or requests which hinders my ability to provide the patient with adequate care. Their continued noncompliant behavior places their own health at risk and could potentially jeopardize their  life.       1. 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 Anemia, unspecified type     3. Anemia, chronic disease           PLAN:  1. Continue with the Gleevec  2. Repeat CT's in 6 months (March 2019)  3. Encouraged compliance  4. Check labs every 3 months   5. Refer to GI - he needs f/u endoscopies  RTC in 6 months  Fax note to Miky Ureña    Discussion:     I have explained all of the above in detail and the patient understands all of the current recommendation(s). I have answered all of their questions to the best of my ability and to their complete satisfaction.   The patient is to continue with the current management plan.            Electronically signed by Fran Daley MD

## 2018-09-28 ENCOUNTER — TELEPHONE (OUTPATIENT)
Dept: HEMATOLOGY/ONCOLOGY | Facility: CLINIC | Age: 53
End: 2018-09-28

## 2018-09-28 NOTE — TELEPHONE ENCOUNTER
Called patient to let him know that the comparison had not been made yet and we will call with results next week       ----- Message from Wanda Harley sent at 9/28/2018 10:50 AM CDT -----  Pt states Dr. Daley was going to get more information on the MRI, like the size of the nodules or tumors before calling back with the results.....but pt hasn't heard anything back yet.    CB# [phone]    Thanks,  Wanda

## 2018-12-12 LAB
% SATURATION: 20 %
ALBUMIN SERPL-MCNC: 4.6 G/DL (ref 3.1–4.7)
ALP SERPL-CCNC: 50 IU/L (ref 40–104)
ALT (SGPT): 38 IU/L (ref 3–33)
AST SERPL-CCNC: 34 IU/L (ref 10–40)
BASOPHILS NFR BLD: 0 K/UL (ref 0–0.2)
BASOPHILS NFR BLD: 0.6 %
BILIRUB SERPL-MCNC: 0.6 MG/DL (ref 0.3–1)
BUN SERPL-MCNC: 13 MG/DL (ref 8–20)
CALCIUM SERPL-MCNC: 9.6 MG/DL (ref 7.7–10.4)
CHLORIDE: 101 MMOL/L (ref 98–110)
CO2 SERPL-SCNC: 25.3 MMOL/L (ref 22.8–31.6)
CREATININE: 1.05 MG/DL (ref 0.6–1.4)
EOSINOPHIL NFR BLD: 0.1 K/UL (ref 0–0.7)
EOSINOPHIL NFR BLD: 2.5 %
ERYTHROCYTE [DISTWIDTH] IN BLOOD BY AUTOMATED COUNT: 14.3 % (ref 11.7–14.9)
GLUCOSE: 105 MG/DL (ref 70–99)
GRAN #: 2.8 K/UL (ref 1.4–6.5)
GRAN%: 54 %
HCT VFR BLD AUTO: 37.1 % (ref 39–55)
HGB BLD-MCNC: 12.2 G/DL (ref 14–16)
IMMATURE GRANS (ABS): 0 K/UL (ref 0–1)
IMMATURE GRANULOCYTES: 0.2 %
IRON: 79 MCG/DL (ref 32–176)
LYMPH #: 1.6 K/UL (ref 1.2–3.4)
LYMPH%: 29.8 %
MCH RBC QN AUTO: 31 PG (ref 25–35)
MCHC RBC AUTO-ENTMCNC: 32.9 G/DL (ref 31–36)
MCV RBC AUTO: 94.4 FL (ref 80–100)
MONO #: 0.7 K/UL (ref 0.1–0.6)
MONO%: 12.9 %
NUCLEATED RBCS: 0 %
PLATELET # BLD AUTO: 302 K/UL (ref 140–440)
PMV BLD AUTO: 10.3 FL (ref 8.8–12.7)
POTASSIUM SERPL-SCNC: 3.9 MMOL/L (ref 3.5–5)
PROT SERPL-MCNC: 7.4 G/DL (ref 6–8.2)
RBC # BLD AUTO: 3.93 M/UL (ref 4.3–5.9)
SODIUM: 137 MMOL/L (ref 134–144)
TOTAL IRON BINDING CAPACITY: 405 MCG/DL (ref 177–435)
WBC # BLD AUTO: 5.2 K/UL (ref 5–10)

## 2018-12-17 ENCOUNTER — TELEPHONE (OUTPATIENT)
Dept: HEMATOLOGY/ONCOLOGY | Facility: CLINIC | Age: 53
End: 2018-12-17

## 2018-12-17 NOTE — TELEPHONE ENCOUNTER
----- Message from Piper Huerta sent at 12/12/2018  3:49 PM CST -----  Pt needs a letter from Dr. GARNICA stating that Dr. GARNICA feels that he is fit to return to duty with the [employer]. Pt can pick it up on Monday. Please call when pt with any questions and when letter is ready for .

## 2019-01-01 ENCOUNTER — OFFICE VISIT (OUTPATIENT)
Dept: HEMATOLOGY/ONCOLOGY | Facility: CLINIC | Age: 54
End: 2019-01-01
Payer: COMMERCIAL

## 2019-01-01 ENCOUNTER — LAB VISIT (OUTPATIENT)
Dept: LAB | Facility: HOSPITAL | Age: 54
End: 2019-01-01
Attending: INTERNAL MEDICINE
Payer: COMMERCIAL

## 2019-01-01 ENCOUNTER — HOSPITAL ENCOUNTER (OUTPATIENT)
Dept: RADIOLOGY | Facility: HOSPITAL | Age: 54
Discharge: HOME | End: 2019-09-04
Attending: INTERNAL MEDICINE
Payer: COMMERCIAL

## 2019-01-01 ENCOUNTER — TELEPHONE (OUTPATIENT)
Dept: HEMATOLOGY/ONCOLOGY | Facility: CLINIC | Age: 54
End: 2019-01-01

## 2019-01-01 VITALS
TEMPERATURE: 98 F | WEIGHT: 191.19 LBS | SYSTOLIC BLOOD PRESSURE: 124 MMHG | RESPIRATION RATE: 20 BRPM | HEART RATE: 74 BPM | BODY MASS INDEX: 27.05 KG/M2 | DIASTOLIC BLOOD PRESSURE: 73 MMHG

## 2019-01-01 VITALS
BODY MASS INDEX: 27.23 KG/M2 | TEMPERATURE: 98 F | RESPIRATION RATE: 20 BRPM | HEART RATE: 69 BPM | SYSTOLIC BLOOD PRESSURE: 111 MMHG | DIASTOLIC BLOOD PRESSURE: 75 MMHG | WEIGHT: 192.5 LBS

## 2019-01-01 DIAGNOSIS — C49.A3 MALIGNANT GASTROINTESTINAL STROMAL TUMOR (GIST) OF SMALL INTESTINE: ICD-10-CM

## 2019-01-01 DIAGNOSIS — C49.9 SARCOMA: ICD-10-CM

## 2019-01-01 DIAGNOSIS — D64.9 ANEMIA, UNSPECIFIED TYPE: ICD-10-CM

## 2019-01-01 DIAGNOSIS — D63.8 ANEMIA, CHRONIC DISEASE: ICD-10-CM

## 2019-01-01 DIAGNOSIS — C49.A3 MALIGNANT GASTROINTESTINAL STROMAL TUMOR (GIST) OF SMALL INTESTINE: Primary | ICD-10-CM

## 2019-01-01 DIAGNOSIS — R91.1 LUNG NODULE: ICD-10-CM

## 2019-01-01 LAB
% SATURATION: 17 %
% SATURATION: 19 %
ALBUMIN SERPL BCP-MCNC: 4.5 G/DL (ref 3.5–5.2)
ALBUMIN SERPL-MCNC: 4.5 G/DL (ref 3.1–4.7)
ALBUMIN SERPL-MCNC: 4.8 G/DL (ref 3.1–4.7)
ALP SERPL-CCNC: 49 IU/L (ref 40–104)
ALP SERPL-CCNC: 49 IU/L (ref 40–104)
ALP SERPL-CCNC: 49 U/L (ref 55–135)
ALT (SGPT): 31 IU/L (ref 3–33)
ALT (SGPT): 38 IU/L (ref 3–33)
ALT SERPL W/O P-5'-P-CCNC: 40 U/L (ref 10–44)
ANION GAP SERPL CALC-SCNC: 9 MMOL/L (ref 8–16)
AST SERPL-CCNC: 29 IU/L (ref 10–40)
AST SERPL-CCNC: 32 IU/L (ref 10–40)
AST SERPL-CCNC: 36 U/L (ref 10–40)
BASOPHILS # BLD AUTO: 0.02 K/UL (ref 0–0.2)
BASOPHILS NFR BLD: 0 K/UL (ref 0–0.2)
BASOPHILS NFR BLD: 0 K/UL (ref 0–0.2)
BASOPHILS NFR BLD: 0.5 % (ref 0–1.9)
BASOPHILS NFR BLD: 0.7 %
BASOPHILS NFR BLD: 0.8 %
BILIRUB SERPL-MCNC: 0.3 MG/DL (ref 0.3–1)
BILIRUB SERPL-MCNC: 0.5 MG/DL (ref 0.1–1)
BILIRUB SERPL-MCNC: 0.7 MG/DL (ref 0.3–1)
BUN SERPL-MCNC: 10 MG/DL (ref 6–20)
BUN SERPL-MCNC: 11 MG/DL (ref 8–20)
BUN SERPL-MCNC: 15 MG/DL (ref 8–20)
CALCIUM SERPL-MCNC: 8.9 MG/DL (ref 7.7–10.4)
CALCIUM SERPL-MCNC: 9.4 MG/DL (ref 8.7–10.5)
CALCIUM SERPL-MCNC: 9.7 MG/DL (ref 7.7–10.4)
CHLORIDE SERPL-SCNC: 104 MMOL/L (ref 95–110)
CHLORIDE: 102 MMOL/L (ref 98–110)
CHLORIDE: 97 MMOL/L (ref 98–110)
CO2 SERPL-SCNC: 27 MMOL/L (ref 23–29)
CO2 SERPL-SCNC: 29.1 MMOL/L (ref 22.8–31.6)
CO2 SERPL-SCNC: 29.2 MMOL/L (ref 22.8–31.6)
CREAT SERPL-MCNC: 1 MG/DL (ref 0.5–1.4)
CREATININE: 0.88 MG/DL (ref 0.6–1.4)
CREATININE: 1.02 MG/DL (ref 0.6–1.4)
DIFFERENTIAL METHOD: ABNORMAL
EOSINOPHIL # BLD AUTO: 0.1 K/UL (ref 0–0.5)
EOSINOPHIL NFR BLD: 0.1 K/UL (ref 0–0.7)
EOSINOPHIL NFR BLD: 0.1 K/UL (ref 0–0.7)
EOSINOPHIL NFR BLD: 2.3 %
EOSINOPHIL NFR BLD: 2.6 %
EOSINOPHIL NFR BLD: 2.7 % (ref 0–8)
ERYTHROCYTE [DISTWIDTH] IN BLOOD BY AUTOMATED COUNT: 14.3 % (ref 12.5–14.5)
ERYTHROCYTE [DISTWIDTH] IN BLOOD BY AUTOMATED COUNT: 14.5 % (ref 11.5–14.5)
ERYTHROCYTE [DISTWIDTH] IN BLOOD BY AUTOMATED COUNT: 14.7 % (ref 12.5–14.5)
EST. GFR  (AFRICAN AMERICAN): >60 ML/MIN/1.73 M^2
EST. GFR  (NON AFRICAN AMERICAN): >60 ML/MIN/1.73 M^2
FERRITIN SERPL-MCNC: 157 NG/ML (ref 20–300)
FERRITIN SERPL-MCNC: 162 NG/ML (ref 37–201)
GLUCOSE SERPL-MCNC: 101 MG/DL (ref 70–110)
GLUCOSE: 102 MG/DL (ref 70–99)
GLUCOSE: 96 MG/DL (ref 70–99)
GRAN #: 2.1 K/UL (ref 1.4–6.5)
GRAN #: 2.6 K/UL (ref 1.4–6.5)
GRAN%: 54 %
GRAN%: 56.3 %
HCT VFR BLD AUTO: 35.7 % (ref 39–55)
HCT VFR BLD AUTO: 35.8 % (ref 39–55)
HCT VFR BLD AUTO: 35.9 % (ref 40–54)
HGB BLD-MCNC: 11.7 G/DL (ref 14–16)
HGB BLD-MCNC: 11.9 G/DL (ref 14–18)
HGB BLD-MCNC: 12.2 G/DL (ref 14–16)
IMM GRANULOCYTES # BLD AUTO: 0.01 K/UL (ref 0–0.04)
IMM GRANULOCYTES NFR BLD AUTO: 0.2 % (ref 0–0.5)
IMMATURE GRANS (ABS): 0 K/UL (ref 0–1)
IMMATURE GRANS (ABS): 0 K/UL (ref 0–1)
IMMATURE GRANULOCYTES: 0.2 %
IMMATURE GRANULOCYTES: 0.3 %
IRON SERPL-MCNC: 60 UG/DL (ref 45–160)
IRON: 74 MCG/DL (ref 32–176)
IRON: 77 MCG/DL (ref 32–176)
LYMPH #: 1.2 K/UL (ref 1.2–3.4)
LYMPH #: 1.3 K/UL (ref 1.2–3.4)
LYMPH%: 28.8 %
LYMPH%: 31.1 %
LYMPHOCYTES # BLD AUTO: 1.3 K/UL (ref 1–4.8)
LYMPHOCYTES NFR BLD: 30.9 % (ref 18–48)
MCH RBC QN AUTO: 31.4 PG (ref 27–31)
MCH RBC QN AUTO: 31.8 PG (ref 25–35)
MCH RBC QN AUTO: 32.5 PG (ref 25–35)
MCHC RBC AUTO-ENTMCNC: 32.8 G/DL (ref 31–36)
MCHC RBC AUTO-ENTMCNC: 33.1 G/DL (ref 32–36)
MCHC RBC AUTO-ENTMCNC: 34.1 G/DL (ref 31–36)
MCV RBC AUTO: 95 FL (ref 82–98)
MCV RBC AUTO: 95.5 FL (ref 80–100)
MCV RBC AUTO: 97 FL (ref 80–100)
MONO #: 0.5 K/UL (ref 0.1–0.6)
MONO #: 0.5 K/UL (ref 0.1–0.6)
MONO%: 11.4 %
MONO%: 11.5 %
MONOCYTES # BLD AUTO: 0.4 K/UL (ref 0.3–1)
MONOCYTES NFR BLD: 10.9 % (ref 4–15)
NEUTROPHILS # BLD AUTO: 2.2 K/UL (ref 1.8–7.7)
NEUTROPHILS NFR BLD: 54.8 % (ref 38–73)
NRBC BLD-RTO: 0 /100 WBC
NUCLEATED RBCS: 0 %
NUCLEATED RBCS: 0 %
NUCLEATED RED BLOOD CELLS: 0 /100 WBC
NUCLEATED RED BLOOD CELLS: 0 /100 WBC
PERFORMED BY:: ABNORMAL
PERFORMED BY:: ABNORMAL
PLATELET # BLD AUTO: 268 K/UL (ref 140–440)
PLATELET # BLD AUTO: 272 K/UL (ref 150–350)
PLATELET # BLD AUTO: 296 K/UL (ref 140–440)
PMV BLD AUTO: 9.4 FL (ref 8.8–12.7)
PMV BLD AUTO: 9.5 FL (ref 9.2–12.9)
PMV BLD AUTO: 9.6 FL (ref 8.8–12.7)
POTASSIUM SERPL-SCNC: 4.2 MMOL/L (ref 3.5–5)
POTASSIUM SERPL-SCNC: 4.3 MMOL/L (ref 3.5–5.1)
POTASSIUM SERPL-SCNC: 4.4 MMOL/L (ref 3.5–5)
PROT SERPL-MCNC: 7.2 G/DL (ref 6–8.2)
PROT SERPL-MCNC: 7.4 G/DL (ref 6–8.4)
PROT SERPL-MCNC: 7.9 G/DL (ref 6–8.2)
RBC # BLD AUTO: 3.68 M/UL (ref 4.3–5.9)
RBC # BLD AUTO: 3.75 M/UL (ref 4.3–5.9)
RBC # BLD AUTO: 3.79 M/UL (ref 4.6–6.2)
SATURATED IRON: 15 % (ref 20–50)
SODIUM SERPL-SCNC: 140 MMOL/L (ref 136–145)
SODIUM: 135 MMOL/L (ref 134–144)
SODIUM: 140 MMOL/L (ref 134–144)
TOTAL IRON BINDING CAPACITY: 398 MCG/DL (ref 177–435)
TOTAL IRON BINDING CAPACITY: 403 UG/DL (ref 250–450)
TOTAL IRON BINDING CAPACITY: 443 MCG/DL (ref 177–435)
TRANSFERRIN SERPL-MCNC: 288 MG/DL (ref 200–375)
WBC # BLD AUTO: 4.04 K/UL (ref 3.9–12.7)
WBC # BLD: 3.9 K/UL (ref 5–10)
WBC # BLD: 4.6 K/UL (ref 5–10)

## 2019-01-01 PROCEDURE — 99214 OFFICE O/P EST MOD 30 MIN: CPT | Mod: S$GLB,,, | Performed by: INTERNAL MEDICINE

## 2019-01-01 PROCEDURE — 3008F PR BODY MASS INDEX (BMI) DOCUMENTED: ICD-10-PCS | Mod: ,,, | Performed by: INTERNAL MEDICINE

## 2019-01-01 PROCEDURE — 3008F BODY MASS INDEX DOCD: CPT | Mod: ,,, | Performed by: INTERNAL MEDICINE

## 2019-01-01 PROCEDURE — 3008F BODY MASS INDEX DOCD: CPT | Mod: S$GLB,,, | Performed by: INTERNAL MEDICINE

## 2019-01-01 PROCEDURE — 99214 PR OFFICE/OUTPT VISIT, EST, LEVL IV, 30-39 MIN: ICD-10-PCS | Mod: ,,, | Performed by: INTERNAL MEDICINE

## 2019-01-01 PROCEDURE — 85025 COMPLETE CBC W/AUTO DIFF WBC: CPT

## 2019-01-01 PROCEDURE — 74177 CT ABD & PELVIS W/CONTRAST: CPT | Mod: TC,PO

## 2019-01-01 PROCEDURE — 25500020 PHARM REV CODE 255: Mod: PO | Performed by: INTERNAL MEDICINE

## 2019-01-01 PROCEDURE — 99214 OFFICE O/P EST MOD 30 MIN: CPT | Mod: ,,, | Performed by: INTERNAL MEDICINE

## 2019-01-01 PROCEDURE — 99214 PR OFFICE/OUTPT VISIT, EST, LEVL IV, 30-39 MIN: ICD-10-PCS | Mod: S$GLB,,, | Performed by: INTERNAL MEDICINE

## 2019-01-01 PROCEDURE — 83540 ASSAY OF IRON: CPT

## 2019-01-01 PROCEDURE — 80053 COMPREHEN METABOLIC PANEL: CPT

## 2019-01-01 PROCEDURE — 3008F PR BODY MASS INDEX (BMI) DOCUMENTED: ICD-10-PCS | Mod: S$GLB,,, | Performed by: INTERNAL MEDICINE

## 2019-01-01 PROCEDURE — 82728 ASSAY OF FERRITIN: CPT

## 2019-01-01 RX ORDER — IMATINIB MESYLATE 400 MG/1
TABLET, FILM COATED ORAL
Qty: 30 TABLET | Refills: 0 | Status: SHIPPED | OUTPATIENT
Start: 2019-01-01 | End: 2019-01-01

## 2019-01-01 RX ORDER — IMATINIB MESYLATE 400 MG/1
400 TABLET, FILM COATED ORAL DAILY
Qty: 90 TABLET | Refills: 2 | Status: SHIPPED | OUTPATIENT
Start: 2019-01-01

## 2019-01-01 RX ORDER — LORATADINE 10 MG/1
10 TABLET ORAL
COMMUNITY
End: 2019-01-01

## 2019-01-01 RX ADMIN — IOHEXOL 100 ML: 350 INJECTION, SOLUTION INTRAVENOUS at 09:09

## 2019-01-22 DIAGNOSIS — C49.A3 MALIGNANT GASTROINTESTINAL STROMAL TUMOR (GIST) OF SMALL INTESTINE: ICD-10-CM

## 2019-01-23 RX ORDER — IMATINIB MESYLATE 400 MG/1
TABLET, FILM COATED ORAL
Qty: 30 TABLET | Refills: 0 | Status: SHIPPED | OUTPATIENT
Start: 2019-01-23 | End: 2019-01-01 | Stop reason: SDUPTHER

## 2019-03-21 NOTE — TELEPHONE ENCOUNTER
Called went over results of ct  And let him know Edi still wants to see him in a couple of weeks     ----- Message from Fran Daley MD sent at 3/21/2019  8:18 AM CDT -----  Call him with good report

## 2019-04-01 NOTE — PROGRESS NOTES
[facility] Hematology/Oncology  PROGRESS NOTE      Subjective:       Patient ID:   NAME: Narciso Flanagan : 1965     54 y.o. male    Referring Doc: Lucy Bolaños (onc)  Other Physicians: Lilli Obrien (PCP); Db    Chief Complaint:  GIST f/u    History of Present Illness:     Patient returns today for a regularly scheduled follow-up visit.  The patient had a follow-up scan. He is here with his sister. He had some recent scans on 3/20/2019. He reports that he is doing ok. No CP, SOB, HA's or N/V. He has had some fatigue and generalized aches in hands and arms but it has been stable. He saw Dr Ace and had repeat endoscopies which were negative.             ROS:   GEN: normal without any fever, night sweats or weight loss  HEENT: normal with no HA's, sore throat, stiff neck, changes in vision  CV: normal with no CP, SOB, PND, FRAIRE or orthopnea  PULM: normal with no SOB, cough, hemoptysis, sputum or pleuritic pain  GI: normal with no abdominal pain, nausea, vomiting, constipation, diarrhea, melanotic stools, BRBPR, or hematemesis  : normal with no hematuria, dysuria  BREAST: normal with no mass, discharge, pain  SKIN: normal with no rash, erythema, bruising, or swelling    Allergies:  Review of patient's allergies indicates:  No Known Allergies    Medications:    Current Outpatient Medications:     glucosamine sulfate (GLUCOSAMINE) 500 mg Tab, Take by mouth., Disp: , Rfl:     loratadine (CLARITIN) 10 mg tablet, Take 10 mg by mouth., Disp: , Rfl:     omeprazole (PRILOSEC) 40 MG capsule, Take 40 mg by mouth once daily., Disp: , Rfl:     imatinib (GLEEVEC) 400 MG Tab, Take 1 tablet (400 mg total) by mouth once daily., Disp: 90 tablet, Rfl: 2    PMHx/PSHx Updates:  See patient's last visit with me on 2018  See H&P on 10/31/16        Pathology:  See path report from 2014          Objective:     Vitals:  Blood pressure 111/75, pulse 69, temperature 97.7 °F (36.5 °C), resp. rate 20, weight 87.3 kg  (192 lb 8 oz).    Physical Examination:   GEN: no apparent distress, comfortable; AAOx3  HEAD: atraumatic and normocephalic  EYES: no pallor, no icterus, PERRLA  ENT: OMM, no pharyngeal erythema, external ears WNL; no nasal discharge; no thrush  NECK: no masses, thyroid normal, trachea midline, no LAD/LN's, supple  CV: RRR with no murmur; normal pulse; normal S1 and S2; no pedal edema  CHEST: Normal respiratory effort; CTAB; normal breath sounds; no wheeze or crackles  ABDOM: nontender and nondistended; soft; normal bowel sounds; no rebound/guarding  MUSC/Skeletal: ROM normal; no crepitus; joints normal; no deformities or arthropathy  EXTREM: no clubbing, cyanosis, inflammation or swelling  SKIN: no rashes, lesions, ulcers, petechiae or subcutaneous nodules  : no pascual  NEURO: grossly intact; motor/sensory WNL; AAOx3; no tremors  PSYCH: normal mood, affect and behavior  LYMPH: normal cervical, supraclavicular, axillary and groin LN's            Labs:       3/20/2019    Lab Results   Component Value Date    WBC 3.9 (L) 03/20/2019    HGB 11.7 (L) 03/20/2019    HCT 35.7 (L) 03/20/2019    MCV 94.4 12/12/2018     03/20/2019     BMP  Lab Results   Component Value Date     03/20/2019    K 4.2 03/20/2019    CL 97 (L) 03/20/2019    CO2 29.2 03/20/2019    BUN 11 03/20/2019    CREATININE 0.88 03/20/2019    CALCIUM 8.9 03/20/2019    ANIONGAP 15 01/11/2011    ESTGFRAFRICA >60 01/11/2011    EGFRNONAA >60 01/11/2011       Lab Results   Component Value Date    IRON 74 03/20/2019    TIBC 398 03/20/2019    FERRITIN 111 06/04/2018           Radiology/Diagnostic Studies:    CT scans 3/20/2019    Chest:  IMPRESSION:    No significant detrimental interval change.    Findings compatible with pulmonary sequestration within the right lower lobe,  Stable.      Abdom;  IMPRESSION:    No significant detrimental interval change.    Stable tiny subcentimeter low-density foci within the liver.    Probable renal cortical cysts,  unchanged.    Scattered colonic diverticula. Thickening involving a segment of the sigmoid colon has similar appearance to the prior examination and potentially relates to muscular hypertrophy.          MRA Abdom 9/19/2018    IMPRESSION:    Vessel arising from the suprarenal segment of the abdominal aorta above the origin of the celiac branches apparently related to previously described pulmonary sequestration.    The celiac artery branches arise independently from the aorta as anatomic variant.    No additional significant abnormality identified.      CT scans on 3/14/2018:  IMPRESSION:    1. Stable appearance of numerous subcentimeter hypodense hepatic lesions  compared to prior exams. No new or enlarging hepatic lesion.  2. No CT evidence of intrathoracic metastasis.  3. 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  4. Stable incidental findings as above.      MRA Abdom 3/15/2018:    IMPRESSION:  1. Tiny circumscribed T1 hypointense and T2 hyperintense hepatic lesions are  unchanged when compared to September 2017, with no new hepatic abnormalities  identified.  2. Lobulated soft tissue mass in the posterior segment of the right lower lobe  is unchanged. By report, this reflects pulmonary sequestration.  3. Small bilateral renal cysts.  4. Small amount of gallbladder sludge.  I have reviewed all available lab results and radiology reports.    Assessment/Plan:   (1) 54 y.o. 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  - originally diagnosed in Sept 2014  - he was previously under the care of Dr Rosalind Bolaños at [facility] in [place]  - he has been on Gleevec since Oct 2014  - last CT scans and MRA of abdom on 3/14/2018 and 3/15/2018 are stable  - he had MRA on 9/19/2018 which overall looks stable  - he had recent scopes from Dr Ace      (2) Anemia - chronic with microcytic indices; most likely multifactorial etiology; will check iron panel and  consider oral iron  - hgb at 11.7  - iron was good at 74 and ferritin was good at 111    (3) Mild leucopenia - also chronic; latest wbc at 3.9 and adequate    (4) RLL calcified lung nodule - chronic - I referred him to see Dr Downs with Pulm in the past but he did not follow-up  - stable on latest CT    (5) GERD - followed by Dr Leo Sargent with [facility] in past and now Dr Ace      (6) mild vertebral bone chip after fall - seen and followed by Humble Rodrigues ([facility])    (7) Noncompliance with requested referrals - Noncomplinance issue  - patient is persistently noncompliant with my recommendations, medical advice and/or requests which hinders my ability to provide the patient with adequate care. Their continued noncompliant behavior places their own health at risk and could potentially jeopardize their life.       1. 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 Anemia, unspecified type     3. Anemia, chronic disease           PLAN:  1. Continue with the Gleevec  2. Repeat CT's in 6 months (Sept 2019)  3. Encouraged compliance  4. Check labs every 3 months     RTC in 6 months  Fax note to Miky Ureña; Db    Discussion:     I have explained all of the above in detail and the patient understands all of the current recommendation(s). I have answered all of their questions to the best of my ability and to their complete satisfaction.   The patient is to continue with the current management plan.            Electronically signed by Fran Daley MD

## 2019-09-04 NOTE — TELEPHONE ENCOUNTER
----- Message from Fran Daley MD sent at 9/4/2019 11:45 AM CDT -----  Call him with good report        Spoke to Narciso with scan results

## 2019-09-15 NOTE — PROGRESS NOTES
[facility] Hematology/Oncology  PROGRESS NOTE      Subjective:       Patient ID:   NAME: Narciso Flanagan : 1965     54 y.o. male    Referring Doc: Lucy Bolaños (onc)  Other Physicians: Lilli Obrien (PCP); [place]    Chief Complaint:  GIST f/u    History of Present Illness:     Patient returns today for a regularly scheduled follow-up visit.  The patient had a follow-up scan. He is here with his mom. He had some recent scans on 2019. He reports that he is doing ok. No CP, SOB, HA's or N/V. He has had some fatigue and generalized aches in hands and arms but it has been stable.             ROS:   GEN: normal without any fever, night sweats or weight loss  HEENT: normal with no HA's, sore throat, stiff neck, changes in vision  CV: normal with no CP, SOB, PND, FRAIRE or orthopnea  PULM: normal with no SOB, cough, hemoptysis, sputum or pleuritic pain  GI: normal with no abdominal pain, nausea, vomiting, constipation, diarrhea, melanotic stools, BRBPR, or hematemesis  : normal with no hematuria, dysuria  BREAST: normal with no mass, discharge, pain  SKIN: normal with no rash, erythema, bruising, or swelling    Allergies:  Review of patient's allergies indicates:  No Known Allergies    Medications:    Current Outpatient Medications:     glucosamine sulfate (GLUCOSAMINE) 500 mg Tab, Take by mouth., Disp: , Rfl:     imatinib (GLEEVEC) 400 MG Tab, Take 1 tablet (400 mg total) by mouth once daily., Disp: 90 tablet, Rfl: 2    omeprazole (PRILOSEC) 40 MG capsule, Take 40 mg by mouth once daily., Disp: , Rfl:     PMHx/PSHx Updates:  See patient's last visit with me on 2019  See H&P on 10/31/16        Pathology:  See path report from 2014          Objective:     Vitals:  Blood pressure 124/73, pulse 74, temperature 97.8 °F (36.6 °C), temperature source Oral, resp. rate 20, weight 86.7 kg (191 lb 3.2 oz).    Physical Examination:   GEN: no apparent distress, comfortable; AAOx3  HEAD: atraumatic and  normocephalic  EYES: no pallor, no icterus, PERRLA  ENT: OMM, no pharyngeal erythema, external ears WNL; no nasal discharge; no thrush  NECK: no masses, thyroid normal, trachea midline, no LAD/LN's, supple  CV: RRR with no murmur; normal pulse; normal S1 and S2; no pedal edema  CHEST: Normal respiratory effort; CTAB; normal breath sounds; no wheeze or crackles  ABDOM: nontender and nondistended; soft; normal bowel sounds; no rebound/guarding  MUSC/Skeletal: ROM normal; no crepitus; joints normal; no deformities or arthropathy  EXTREM: no clubbing, cyanosis, inflammation or swelling  SKIN: no rashes, lesions, ulcers, petechiae or subcutaneous nodules  : no pascual  NEURO: grossly intact; motor/sensory WNL; AAOx3; no tremors  PSYCH: normal mood, affect and behavior  LYMPH: normal cervical, supraclavicular, axillary and groin LN's            Labs:       9/4/2019    Lab Results   Component Value Date    WBC 4.04 09/04/2019    HGB 11.9 (L) 09/04/2019    HCT 35.9 (L) 09/04/2019    MCV 95 09/04/2019     09/04/2019     BMP  Lab Results   Component Value Date     09/04/2019    K 4.3 09/04/2019     09/04/2019    CO2 27 09/04/2019    BUN 10 09/04/2019    CREATININE 1.0 09/04/2019    CALCIUM 9.4 09/04/2019    ANIONGAP 9 09/04/2019    ESTGFRAFRICA >60.0 09/04/2019    EGFRNONAA >60.0 09/04/2019       Lab Results   Component Value Date    IRON 60 09/04/2019    TIBC 403 09/04/2019    FERRITIN 157 09/04/2019           Radiology/Diagnostic Studies:    CT scans 9/4/2019:    Impression       1. Unchanged tiny hepatic lesions dating back to 12/07/2016 MRI, with favored etiology unchanged from that previously reported at that time.  2. Unchanged mild diffuse hepatic steatosis.  3. 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  4. No new metastatic disease..             CT scans 3/20/2019    Chest:  IMPRESSION:    No significant detrimental interval change.    Findings compatible  with pulmonary sequestration within the right lower lobe,  Stable.      Abdom;  IMPRESSION:    No significant detrimental interval change.    Stable tiny subcentimeter low-density foci within the liver.    Probable renal cortical cysts, unchanged.    Scattered colonic diverticula. Thickening involving a segment of the sigmoid colon has similar appearance to the prior examination and potentially relates to muscular hypertrophy.          MRA Abdom 9/19/2018    IMPRESSION:    Vessel arising from the suprarenal segment of the abdominal aorta above the origin of the celiac branches apparently related to previously described pulmonary sequestration.    The celiac artery branches arise independently from the aorta as anatomic variant.    No additional significant abnormality identified.      CT scans on 3/14/2018:  IMPRESSION:    1. Stable appearance of numerous subcentimeter hypodense hepatic lesions  compared to prior exams. No new or enlarging hepatic lesion.  2. No CT evidence of intrathoracic metastasis.  3. 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  4. Stable incidental findings as above.      MRA Abdom 3/15/2018:    IMPRESSION:  1. Tiny circumscribed T1 hypointense and T2 hyperintense hepatic lesions are  unchanged when compared to September 2017, with no new hepatic abnormalities  identified.  2. Lobulated soft tissue mass in the posterior segment of the right lower lobe  is unchanged. By report, this reflects pulmonary sequestration.  3. Small bilateral renal cysts.  4. Small amount of gallbladder sludge.  I have reviewed all available lab results and radiology reports.    Assessment/Plan:   (1) 54 y.o. 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  - originally diagnosed in Sept 2014  - he was previously under the care of Dr Rosalind Bolaños at [facility] in [place]  - he has been on Gleevec since Oct 2014  - last CT scans and MRA of abdom on 3/14/2018 and  3/15/2018 are stable  - he had MRA on 9/19/2018 which overall looks stable  - he had recent scopes from Dr Ace      (2) Anemia - chronic with microcytic indices; most likely multifactorial etiology; will check iron panel and consider oral iron  - hgb at 11.9  - iron panel was normal    (3) Mild leucopenia - also chronic; latest wbc at 4.04 and adequate    (4) RLL calcified lung nodule - chronic - I referred him to see Dr oDwns with Pulm in the past but he did not follow-up  - stable on latest CT    (5) GERD - followed by Dr Leo Sargent with [facility] in past and now Dr Ace      (6) mild vertebral bone chip after fall - seen and followed by Humble Rodrigues ([facility])    (7)  Noncomplinance issues in past        1. 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 Anemia, unspecified type     3. Anemia, chronic disease     4. Lung nodule           PLAN:  1. Continue with the Gleevec  2. Repeat CT's in 6 months (March 2020)  3. Encouraged compliance  4. Check labs every 3 months     RTC in 6 months  Fax note to Miky Ureña; Db    Discussion:     I have explained all of the above in detail and the patient understands all of the current recommendation(s). I have answered all of their questions to the best of my ability and to their complete satisfaction.   The patient is to continue with the current management plan.            Electronically signed by Fran Daley MD

## 2020-01-01 ENCOUNTER — LAB VISIT (OUTPATIENT)
Dept: LAB | Facility: HOSPITAL | Age: 55
End: 2020-01-01
Attending: INTERNAL MEDICINE
Payer: COMMERCIAL

## 2020-01-01 DIAGNOSIS — C49.9 SARCOMA: ICD-10-CM

## 2020-01-01 DIAGNOSIS — D63.8 ANEMIA, CHRONIC DISEASE: ICD-10-CM

## 2020-01-01 DIAGNOSIS — C49.A3 MALIGNANT GASTROINTESTINAL STROMAL TUMOR (GIST) OF SMALL INTESTINE: ICD-10-CM

## 2020-01-01 DIAGNOSIS — D64.9 ANEMIA, UNSPECIFIED TYPE: ICD-10-CM

## 2020-01-01 DIAGNOSIS — R91.1 LUNG NODULE: ICD-10-CM

## 2020-01-01 LAB
ALBUMIN SERPL BCP-MCNC: 4.7 G/DL (ref 3.5–5.2)
ALP SERPL-CCNC: 45 U/L (ref 55–135)
ALT SERPL W/O P-5'-P-CCNC: 41 U/L (ref 10–44)
ANION GAP SERPL CALC-SCNC: 9 MMOL/L (ref 8–16)
AST SERPL-CCNC: 35 U/L (ref 10–40)
BASOPHILS # BLD AUTO: 0.02 K/UL (ref 0–0.2)
BASOPHILS NFR BLD: 0.5 % (ref 0–1.9)
BILIRUB SERPL-MCNC: 0.8 MG/DL (ref 0.1–1)
BUN SERPL-MCNC: 11 MG/DL (ref 6–20)
CALCIUM SERPL-MCNC: 9.3 MG/DL (ref 8.7–10.5)
CHLORIDE SERPL-SCNC: 102 MMOL/L (ref 95–110)
CO2 SERPL-SCNC: 29 MMOL/L (ref 23–29)
CREAT SERPL-MCNC: 1.1 MG/DL (ref 0.5–1.4)
DIFFERENTIAL METHOD: ABNORMAL
EOSINOPHIL # BLD AUTO: 0.1 K/UL (ref 0–0.5)
EOSINOPHIL NFR BLD: 3.1 % (ref 0–8)
ERYTHROCYTE [DISTWIDTH] IN BLOOD BY AUTOMATED COUNT: 14.3 % (ref 11.5–14.5)
EST. GFR  (AFRICAN AMERICAN): >60 ML/MIN/1.73 M^2
EST. GFR  (NON AFRICAN AMERICAN): >60 ML/MIN/1.73 M^2
FERRITIN SERPL-MCNC: 182 NG/ML (ref 20–300)
FERRITIN SERPL-MCNC: 182 NG/ML (ref 20–300)
GLUCOSE SERPL-MCNC: 96 MG/DL (ref 70–110)
HCT VFR BLD AUTO: 37.4 % (ref 40–54)
HGB BLD-MCNC: 12.3 G/DL (ref 14–18)
IMM GRANULOCYTES # BLD AUTO: 0.01 K/UL (ref 0–0.04)
IMM GRANULOCYTES NFR BLD AUTO: 0.2 % (ref 0–0.5)
IRON SERPL-MCNC: 91 UG/DL (ref 45–160)
IRON SERPL-MCNC: 91 UG/DL (ref 45–160)
LYMPHOCYTES # BLD AUTO: 1.1 K/UL (ref 1–4.8)
LYMPHOCYTES NFR BLD: 26.1 % (ref 18–48)
MCH RBC QN AUTO: 31.4 PG (ref 27–31)
MCHC RBC AUTO-ENTMCNC: 32.9 G/DL (ref 32–36)
MCV RBC AUTO: 95 FL (ref 82–98)
MONOCYTES # BLD AUTO: 0.6 K/UL (ref 0.3–1)
MONOCYTES NFR BLD: 13.2 % (ref 4–15)
NEUTROPHILS # BLD AUTO: 2.4 K/UL (ref 1.8–7.7)
NEUTROPHILS NFR BLD: 56.9 % (ref 38–73)
NRBC BLD-RTO: 0 /100 WBC
PLATELET # BLD AUTO: 291 K/UL (ref 150–350)
PMV BLD AUTO: 10.1 FL (ref 9.2–12.9)
POTASSIUM SERPL-SCNC: 4.2 MMOL/L (ref 3.5–5.1)
PROT SERPL-MCNC: 7.4 G/DL (ref 6–8.4)
RBC # BLD AUTO: 3.92 M/UL (ref 4.6–6.2)
SATURATED IRON: 22 % (ref 20–50)
SATURATED IRON: 22 % (ref 20–50)
SODIUM SERPL-SCNC: 140 MMOL/L (ref 136–145)
TOTAL IRON BINDING CAPACITY: 407 UG/DL (ref 250–450)
TOTAL IRON BINDING CAPACITY: 407 UG/DL (ref 250–450)
TRANSFERRIN SERPL-MCNC: 291 MG/DL (ref 200–375)
TRANSFERRIN SERPL-MCNC: 291 MG/DL (ref 200–375)
WBC # BLD AUTO: 4.18 K/UL (ref 3.9–12.7)

## 2020-01-01 PROCEDURE — 83540 ASSAY OF IRON: CPT

## 2020-01-01 PROCEDURE — 82728 ASSAY OF FERRITIN: CPT

## 2020-01-01 PROCEDURE — 80053 COMPREHEN METABOLIC PANEL: CPT

## 2020-01-01 PROCEDURE — 85025 COMPLETE CBC W/AUTO DIFF WBC: CPT

## 2020-01-01 PROCEDURE — 36415 COLL VENOUS BLD VENIPUNCTURE: CPT
